# Patient Record
Sex: FEMALE | Race: BLACK OR AFRICAN AMERICAN | NOT HISPANIC OR LATINO | Employment: OTHER | URBAN - METROPOLITAN AREA
[De-identification: names, ages, dates, MRNs, and addresses within clinical notes are randomized per-mention and may not be internally consistent; named-entity substitution may affect disease eponyms.]

---

## 2023-01-16 ENCOUNTER — OFFICE VISIT (OUTPATIENT)
Dept: SLEEP MEDICINE | Facility: HOSPITAL | Age: 73
End: 2023-01-16
Payer: COMMERCIAL

## 2023-01-16 VITALS
DIASTOLIC BLOOD PRESSURE: 83 MMHG | WEIGHT: 198 LBS | HEIGHT: 66 IN | BODY MASS INDEX: 31.82 KG/M2 | HEART RATE: 91 BPM | SYSTOLIC BLOOD PRESSURE: 149 MMHG | OXYGEN SATURATION: 99 %

## 2023-01-16 DIAGNOSIS — E66.9 OBESITY (BMI 30-39.9): ICD-10-CM

## 2023-01-16 DIAGNOSIS — G47.33 OSA (OBSTRUCTIVE SLEEP APNEA): ICD-10-CM

## 2023-01-16 DIAGNOSIS — I10 BENIGN ESSENTIAL HYPERTENSION: ICD-10-CM

## 2023-01-16 DIAGNOSIS — R00.2 PALPITATIONS: Primary | ICD-10-CM

## 2023-01-16 PROBLEM — T46.4X5A ADVERSE EFFECT OF ANGIOTENSIN-CONVERTING ENZYME INHIBITOR: Status: ACTIVE | Noted: 2021-06-02

## 2023-01-16 PROCEDURE — 99204 OFFICE O/P NEW MOD 45 MIN: CPT | Performed by: INTERNAL MEDICINE

## 2023-01-16 RX ORDER — CARVEDILOL 3.12 MG/1
3.12 TABLET ORAL 2 TIMES DAILY
COMMUNITY
Start: 2023-01-04

## 2023-01-16 RX ORDER — EPINEPHRINE 0.3 MG/.3ML
INJECTION SUBCUTANEOUS
COMMUNITY

## 2023-01-16 RX ORDER — INDAPAMIDE 1.25 MG/1
1.25 TABLET, FILM COATED ORAL DAILY
COMMUNITY
Start: 2023-01-04

## 2023-01-16 NOTE — PROGRESS NOTES
Keon Rand is a 72 y.o. female.   Chief Complaint   Patient presents with   • Sleeping Problem       HPI     72 y.o. female seen in consultation at the request of Doug Snyder* for evaluation of the above.     She was reportedly diagnosed with obstructive sleep apnea in [place].  This was around 2011 or 2012 by her best recollection.  It sounds as if the sleep apnea was significant but she has not been able to find the records from back then and she does not recall if she was told how severe her sleep apnea was.  In any event, she was prescribed CPAP therapy.  She did not do well with wearing this and stopped trying after about a month.  The main issue that she had seem to be drainage which caused a problem with her mask and despite trying something to mitigate the drainage she still had problems.  She tried several masks.  She actually still has a machine and brought it with her today but has not used it in over 10 years    She has a history of high blood pressure as well as glomerulosclerosis.  She has been seen by Dr. Snyder in nephrology.  She was referred by him.    In regards to her sleep she averages 7-8 hours of sleep per night.  She initiates sleep within 5 to 10 minutes and awakens once or twice.  She has been told she snores loudly but she currently sleeps alone.  She has had a history with what sounds like palpitations due to PACs or PVCs.  She saw cardiologist in [place] and was told she could be medicated if she wants but that these were benign problems.    She does nap at times during the day.  She is not overtly sleepy but does feel restored and fatigued at times.    [place] Scale is: 4/24    The patient's relevant past medical, surgical, family, and social history reviewed and updated in Epic as appropriate.    Current medications are:   Current Outpatient Medications:   •  carvedilol (COREG) 3.125 MG tablet, Take 3.125 mg by mouth 2 (Two) Times a Day., Disp: , Rfl:   •   "EPINEPHrine (EPIPEN) 0.3 MG/0.3ML solution auto-injector injection, epinephrine 0.3 mg/0.3 mL injection, auto-injector, Disp: , Rfl:   •  indapamide (LOZOL) 1.25 MG tablet, Take 1.25 mg by mouth Daily., Disp: , Rfl: .    Review of Systems    Review of Systems  ROS documented in patient questionnaire ×14 systems.  Reviewed with patient.  Otherwise negative except as noted in HPI.    Physical Exam    Blood pressure 149/83, pulse 91, height 166.4 cm (65.5\"), weight 89.8 kg (198 lb), SpO2 99 %. Body mass index is 32.45 kg/m².    Physical Exam  Vitals and nursing note reviewed.   Constitutional:       Appearance: Normal appearance. She is well-developed.   HENT:      Head: Normocephalic and atraumatic.      Nose: Nose normal.      Mouth/Throat:      Mouth: Mucous membranes are moist.      Pharynx: Oropharynx is clear. No oropharyngeal exudate.      Comments: Class III airway  Eyes:      General: No scleral icterus.     Conjunctiva/sclera: Conjunctivae normal.   Neck:      Thyroid: No thyromegaly.      Trachea: No tracheal deviation.   Cardiovascular:      Rate and Rhythm: Normal rate and regular rhythm.      Heart sounds: No murmur heard.    No friction rub. No gallop.   Pulmonary:      Effort: Pulmonary effort is normal. No respiratory distress.      Breath sounds: No wheezing or rales.   Musculoskeletal:         General: No deformity. Normal range of motion.   Skin:     General: Skin is warm and dry.      Findings: No rash.   Neurological:      Mental Status: She is alert and oriented to person, place, and time.   Psychiatric:         Behavior: Behavior normal.         Thought Content: Thought content normal.         DATA:    Reviewed 9/13/2022 note from Dr. Snyder    Reviewed labs from 8/2/2022 including CBC and BMP    ASSESSMENT:    Problem List Items Addressed This Visit        Pulmonary Problems    ADDISON (obstructive sleep apnea)    Relevant Orders    Home Sleep Study       Other    Obesity (BMI 30-39.9)    " Palpitations - Primary    Benign essential hypertension    Relevant Medications    indapamide (LOZOL) 1.25 MG tablet    carvedilol (COREG) 3.125 MG tablet    EPINEPHrine (EPIPEN) 0.3 MG/0.3ML solution auto-injector injection       72-year-old female reports a history of what could be significant obstructive sleep apnea diagnosed in the past but she was intolerant of CPAP therapy.  She has been told she snores.  She has hypertension and glomerulosclerosis.  She has palpitations.  She does have nonrestorative sleep/daytime somnolence though this is admittedly not severe.  She is at risk for ADDISON based upon her elevated BMI and class III airway.  I recommended further evaluation for the current presence of obstructive sleep apnea and potential treatment.    PLAN:    1. Home sleep apnea testing is an appropriate initial diagnostic step in her case.  2. I discussed the diagnostic process as well as treatment options for obstructive sleep apnea if that is diagnosed.  3. I went over the long-term cardiovascular and metabolic risks of untreated obstructive sleep apnea.  4. I recommended long-term, healthy weight loss.  5. The patient was amenable to a trial of CPAP therapy if deemed appropriate after testing complete.  6. Close sleep center follow-up.      I have reviewed the results of my evaluation and impression and discussed my recommendations in detail with the patient.    Level of Risk Moderate due to: undiagnosed new problem    Moderate risk of morbidity due to the possibility of untreated sleep apnea.    Signed by  Blaise Anthony MD    January 16, 2023      CC: Pema Patel, CHANTELL Snyder, Doug Falcon*

## 2023-02-28 ENCOUNTER — HOSPITAL ENCOUNTER (OUTPATIENT)
Dept: SLEEP MEDICINE | Facility: HOSPITAL | Age: 73
Discharge: HOME | End: 2023-02-28
Admitting: INTERNAL MEDICINE
Payer: COMMERCIAL

## 2023-02-28 VITALS — WEIGHT: 198 LBS | HEIGHT: 66 IN | BODY MASS INDEX: 31.82 KG/M2

## 2023-02-28 DIAGNOSIS — G47.33 OSA (OBSTRUCTIVE SLEEP APNEA): ICD-10-CM

## 2023-02-28 PROCEDURE — 95800 SLP STDY UNATTENDED: CPT

## 2023-03-02 DIAGNOSIS — G47.33 OSA (OBSTRUCTIVE SLEEP APNEA): Primary | ICD-10-CM

## 2023-03-02 NOTE — PROGRESS NOTES
CALLED PATIENT TO ADVISE OF STUDY. PATIENT WAS NOT AVAILABLE AT THE TIME OF CALL AND STATED THAT SHE WOULD CALL BACK.  03/02/23 ALEXIS

## 2023-03-09 PROCEDURE — 95800 SLP STDY UNATTENDED: CPT | Performed by: INTERNAL MEDICINE

## 2023-05-25 ENCOUNTER — OFFICE VISIT (OUTPATIENT)
Dept: SLEEP MEDICINE | Facility: HOSPITAL | Age: 73
End: 2023-05-25
Payer: MEDICARE

## 2023-05-25 VITALS
BODY MASS INDEX: 29.77 KG/M2 | WEIGHT: 185.2 LBS | TEMPERATURE: 97.4 F | HEART RATE: 73 BPM | SYSTOLIC BLOOD PRESSURE: 138 MMHG | DIASTOLIC BLOOD PRESSURE: 73 MMHG | OXYGEN SATURATION: 99 % | HEIGHT: 66 IN

## 2023-05-25 DIAGNOSIS — G47.33 OSA (OBSTRUCTIVE SLEEP APNEA): Primary | ICD-10-CM

## 2023-05-25 RX ORDER — OMEGA-3S/DHA/EPA/FISH OIL/D3 300MG-1000
400 CAPSULE ORAL DAILY
COMMUNITY

## 2023-05-25 RX ORDER — UBIDECARENONE 75 MG
50 CAPSULE ORAL DAILY
COMMUNITY

## 2023-05-25 RX ORDER — CALCIUM CARBONATE 260MG(650)
10 TABLET,CHEWABLE ORAL
COMMUNITY

## 2023-05-25 RX ORDER — MULTIVIT AND MINERALS-FERROUS GLUCONATE 9 MG IRON/15 ML ORAL LIQUID 9 MG/15 ML
LIQUID (ML) ORAL DAILY
COMMUNITY

## 2023-05-25 NOTE — PROGRESS NOTES
Chief Complaint:   Chief Complaint   Patient presents with   • Sleeping Problem       HPI:    Keon Rand is a 72 y.o. female here for follow-up of sleep apnea.  Patient was last seen 1/16/2023.  Patient had a history of sleep apnea but had not used in approximately 10 years and she was having worsening symptoms.  She retested 2/28/2023 showed mild obstructive sleep apnea and did initiate CPAP therapy.  Patient states she is doing better this time.  She has found a mask that she likes and seems she is using nasal.  Patient is sleeping approximately 8 to 9 hours nightly and does have an [place] score of 2/24.  Patient states she has noticed where the mask and headgear sit on top of her head her hair is starting to thin.  Patient does sleep in it Each night and this has not seemed to help.  Patient will work with DME for possibly different set up that does not pull at her hair.  Otherwise she feels she is doing well and will continue therapy.        Current medications are:   Current Outpatient Medications:   •  carvedilol (COREG) 3.125 MG tablet, Take 1 tablet by mouth 2 (Two) Times a Day., Disp: , Rfl:   •  Cholecalciferol 10 MCG (400 UNIT) tablet, Take 1 tablet by mouth Daily., Disp: , Rfl:   •  EPINEPHrine (EPIPEN) 0.3 MG/0.3ML solution auto-injector injection, epinephrine 0.3 mg/0.3 mL injection, auto-injector, Disp: , Rfl:   •  indapamide (LOZOL) 1.25 MG tablet, Take 1 tablet by mouth Daily., Disp: , Rfl:   •  Multiple Vitamins-Minerals (multivitamin and minerals) liquid liquid, Take  by mouth Daily., Disp: , Rfl:   •  vitamin B-12 (CYANOCOBALAMIN) 100 MCG tablet, Take 50 mcg by mouth Daily., Disp: , Rfl:   •  Zinc 10 MG lozenge, Dissolve 10 mg in the mouth., Disp: , Rfl: .      The patient's relevant past medical, surgical, family and social history were reviewed and updated in Epic as appropriate.       Review of Systems   HENT: Positive for congestion.    Respiratory: Positive for apnea.   "  Cardiovascular: Positive for palpitations.   Allergic/Immunologic: Positive for environmental allergies.   Psychiatric/Behavioral: Positive for sleep disturbance.   All other systems reviewed and are negative.        Objective:    Physical Exam  Constitutional:       Appearance: Normal appearance.   HENT:      Head: Normocephalic and atraumatic.      Mouth/Throat:      Comments: Class 3 airway  Cardiovascular:      Rate and Rhythm: Normal rate and regular rhythm.   Pulmonary:      Effort: Pulmonary effort is normal.      Breath sounds: Normal breath sounds.   Skin:     General: Skin is warm and dry.   Neurological:      Mental Status: She is alert and oriented to person, place, and time.   Psychiatric:         Mood and Affect: Mood normal.         Behavior: Behavior normal.         Thought Content: Thought content normal.         Judgment: Judgment normal.       /73 (BP Location: Left arm)   Pulse 73   Temp 97.4 °F (36.3 °C) (Temporal)   Ht 166.4 cm (65.51\")   Wt 84 kg (185 lb 3.2 oz)   SpO2 99%   BMI 30.34 kg/m²     CPAP Report  66/73 days of use  Greater than 4-hour use 8 5%  Settings 8-18  95th percentile pressure 11.7  AHI 1.1    The patient continues to use and benefit from CPAP therapy.    ASSESSMENT/PLAN    Diagnoses and all orders for this visit:    1. ADDISON (obstructive sleep apnea) (Primary)  -     PAP Therapy        1. Counseled patient regarding multimodal approach with healthy nutrition, healthy sleep, regular physical activity, social activities, counseling, and medications. Encouraged to practice lateral sleep position. Avoid alcohol and sedatives close to bedtime.  2.   Refill supplies x1 year.  Return to clinic 1 year sooner symptoms warrant.    I have reviewed the results of my evaluation and impression and discussed my recommendations in detail with the patient.      Signed by  CHANTELL Disla    May 25, 2023      CC: Pema Patel APRN         No ref. provider " found

## 2023-12-29 ENCOUNTER — TELEMEDICINE (OUTPATIENT)
Dept: SLEEP MEDICINE | Facility: CLINIC | Age: 73
End: 2023-12-29
Payer: MEDICARE

## 2023-12-29 DIAGNOSIS — G47.33 OSA (OBSTRUCTIVE SLEEP APNEA): Primary | ICD-10-CM

## 2023-12-29 DIAGNOSIS — R06.7 SNEEZING: ICD-10-CM

## 2023-12-29 PROCEDURE — 99213 OFFICE O/P EST LOW 20 MIN: CPT | Performed by: NURSE PRACTITIONER

## 2023-12-29 NOTE — PROGRESS NOTES
Chief Complaint:   Chief Complaint   Patient presents with    Sleeping Problem       HPI:    Keon Rand is a 73 y.o. female here for follow-up of sleep apnea.  Patient was last seen 5/25/2023.  Patient was having difficulty using CPAP due to sneezing.  She did feel that 1 time she was doing better but started having issues again.  We did speak about trying Allegra.  Patient states she did try Allegra and was doing very well and did take care of this problem for 2 weeks.  Patient then started excessively sneezing and a tingling in her nose again.  Patient did quit using CPAP in the fall.  Patient is eager to use and be compliant.  Patient did sustain nasal lavage at [facility] and is going to try this with an Allegra to see if this helps I explained I think CPAP is so important that if this combination does not work we will need to do a referral to allergy.  Patient verbalizes understanding.        Current medications are:   Current Outpatient Medications:     carvedilol (COREG) 3.125 MG tablet, Take 1 tablet by mouth 2 (Two) Times a Day., Disp: , Rfl:     Cholecalciferol 10 MCG (400 UNIT) tablet, Take 1 tablet by mouth Daily., Disp: , Rfl:     EPINEPHrine (EPIPEN) 0.3 MG/0.3ML solution auto-injector injection, epinephrine 0.3 mg/0.3 mL injection, auto-injector, Disp: , Rfl:     Multiple Vitamins-Minerals (multivitamin and minerals) liquid liquid, Take  by mouth Daily., Disp: , Rfl:     vitamin B-12 (CYANOCOBALAMIN) 100 MCG tablet, Take 50 mcg by mouth Daily., Disp: , Rfl:     Zinc 10 MG lozenge, Dissolve 10 mg in the mouth., Disp: , Rfl: .      The patient's relevant past medical, surgical, family and social history were reviewed and updated in Epic as appropriate.       Review of Systems   HENT:  Positive for congestion and sneezing.    Eyes:  Positive for visual disturbance.   Respiratory:  Positive for apnea.    Cardiovascular:  Positive for palpitations.   Allergic/Immunologic: Positive for environmental  allergies.   Psychiatric/Behavioral:  Positive for sleep disturbance.    All other systems reviewed and are negative.        Objective:    Physical Exam  Constitutional:       Appearance: Normal appearance.   HENT:      Head: Normocephalic and atraumatic.      Mouth/Throat:      Comments: Class 3 airway  Pulmonary:      Effort: Pulmonary effort is normal. No respiratory distress.   Neurological:      Mental Status: She is alert and oriented to person, place, and time.   Psychiatric:         Mood and Affect: Mood normal.         Behavior: Behavior normal.         Thought Content: Thought content normal.         Judgment: Judgment normal.           ASSESSMENT/PLAN    Diagnoses and all orders for this visit:    1. ADDISON (obstructive sleep apnea) (Primary)    2. Sneezing        Counseled patient regarding multimodal approach with healthy nutrition, healthy sleep, regular physical activity, social activities, counseling, and medications. Encouraged to practice lateral sleep position. Avoid alcohol and sedatives close to bedtime.    Patient will restart CPAP while using nasal lavage and Allegra.  [facility] we will follow-up in office as appropriate.  Patient is at home in [place] and gave consent for video visit.    I have reviewed the results of my evaluation and impression and discussed my recommendations in detail with the patient.      Signed by  CHANTELL Disla    December 29, 2023      CC: Pema Patel APRN         No ref. provider found